# Patient Record
(demographics unavailable — no encounter records)

---

## 2019-07-01 NOTE — CT
CT ANGIOGRAM OF THE THORACIC AORTA:

CT ANGIOGRAM OF THE ABDOMINAL AORTA:

CT DISSECTION PROTOCOL:



TECHNIQUE:

CT angiogram of the thoracic and abdominal aorta is performed in the axial plane.  Three-dimensional 
reformatted images are submitted for interpretation.



FINDINGS

CHEST CT:  Trachea and central bronchi are patent.  No pleural effusion.  No pneumothorax.  Minimal d
ependent atelectatic changes.

Left lung:  No suspicious masses or consolidation.

Right lung:  No suspicious masses or consolidation.

No mediastinal mass, lymphadenopathy, or hematoma.  Normal heart size.  No significant pericardial fl
uid.



ABDOMEN CT:  Portal vein is patent.  Mild hypoattenuation of the liver may be due to hepatic steatosi
s.  No enhancing masses in the liver.  Spleen, pancreas, and adrenal glands have appropriate

enhancement.

No gastrohepatic, retrocrural, or periportal lymphadenopathy.

No mesenteric mass, lymphadenopathy, free air, or free fluid.

Symmetric enhancement of the kidneys.  Bilaterally, no obstructive uropathy.

Gastric mucosa, duodenum, and multiple normal caliber small bowel loops are identified.  Ileocecal ju
nction is normal.  Scattered fecal material in a nondistended, nondilated colon.

The visualized uterus is grossly unremarkable.



CT ANGIOGRAM:  The aortic root, ascending thoracic aorta, aortic knob, and descending thoracic aorta 
have appropriate enhancement and luminal diameter.  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

 The visualized cervical carotid arteries are unremarkable.  The abdominal aorta has a normal caliber
.  The aortic bifurcation is unremarkable.  The celiac artery, superior mesenteric artery, and

bilateral renal arteries are unremarkable.  No aneurysm or dissection.  No periaortic fat stranding t
hroughout the entire aorta.

The central pulmonary arteries are unremarkable.   Of note, there are 2 left renal arteries and a sol
itary right renal artery.



IMPRESSION:

No evidence of aneurysm or dissection.



Transcribed Date/Time: 7/1/2019 3:41 PM



Reported By: Iris Bauer 

Electronically Signed:  7/1/2019 4:17 PM